# Patient Record
Sex: FEMALE
[De-identification: names, ages, dates, MRNs, and addresses within clinical notes are randomized per-mention and may not be internally consistent; named-entity substitution may affect disease eponyms.]

---

## 2019-07-18 ENCOUNTER — TRANSCRIPTION ENCOUNTER (OUTPATIENT)
Age: 19
End: 2019-07-18

## 2019-08-19 ENCOUNTER — TRANSCRIPTION ENCOUNTER (OUTPATIENT)
Age: 19
End: 2019-08-19

## 2019-11-27 ENCOUNTER — TRANSCRIPTION ENCOUNTER (OUTPATIENT)
Age: 19
End: 2019-11-27

## 2020-09-28 ENCOUNTER — TRANSCRIPTION ENCOUNTER (OUTPATIENT)
Age: 20
End: 2020-09-28

## 2020-12-06 ENCOUNTER — TRANSCRIPTION ENCOUNTER (OUTPATIENT)
Age: 20
End: 2020-12-06

## 2021-03-12 ENCOUNTER — TRANSCRIPTION ENCOUNTER (OUTPATIENT)
Age: 21
End: 2021-03-12

## 2021-10-12 ENCOUNTER — TRANSCRIPTION ENCOUNTER (OUTPATIENT)
Age: 21
End: 2021-10-12

## 2021-10-31 ENCOUNTER — TRANSCRIPTION ENCOUNTER (OUTPATIENT)
Age: 21
End: 2021-10-31

## 2022-01-22 ENCOUNTER — TRANSCRIPTION ENCOUNTER (OUTPATIENT)
Age: 22
End: 2022-01-22

## 2022-01-25 ENCOUNTER — TRANSCRIPTION ENCOUNTER (OUTPATIENT)
Age: 22
End: 2022-01-25

## 2022-07-06 ENCOUNTER — NON-APPOINTMENT (OUTPATIENT)
Age: 22
End: 2022-07-06

## 2022-07-20 ENCOUNTER — RESULT REVIEW (OUTPATIENT)
Age: 22
End: 2022-07-20

## 2023-02-16 ENCOUNTER — APPOINTMENT (OUTPATIENT)
Dept: ORTHOPEDIC SURGERY | Facility: CLINIC | Age: 23
End: 2023-02-16
Payer: COMMERCIAL

## 2023-02-16 DIAGNOSIS — Z78.9 OTHER SPECIFIED HEALTH STATUS: ICD-10-CM

## 2023-02-16 DIAGNOSIS — Z72.0 TOBACCO USE: ICD-10-CM

## 2023-02-16 DIAGNOSIS — M75.21 BICIPITAL TENDINITIS, RIGHT SHOULDER: ICD-10-CM

## 2023-02-16 PROCEDURE — 99203 OFFICE O/P NEW LOW 30 MIN: CPT

## 2023-02-16 PROCEDURE — 73030 X-RAY EXAM OF SHOULDER: CPT | Mod: RT

## 2023-02-16 NOTE — PHYSICAL EXAM
[de-identified] : Examination of the right shoulder is as follows: \par INSPECTION: no swelling, no ecchymosis, no erythema, no atrophy, no deformity, no scapular winging.\par PALPATION: bicipital groove tenderness\par ROM: active forward flexion 180 degrees, active abduction 180 degrees, internal rotation T12, external rotation 80 degrees. \par -Motion is assessed: standing\par -Pain at end of ROM: mild\par STRENGTH: forward flexion 5/5, abduction 5/5, external rotation 5/5, internal rotation 5/5\par TESTS: negative impingement testing, negative Michele. \par NEURO: motor and sensory intact distally. \par \par X-rays of the right shoulder is as follows:\par Shoulder 2+ View: There are no fractures, subluxations or dislocations. No significant abnormalities are seen.

## 2023-02-16 NOTE — ASSESSMENT
[FreeTextEntry1] : 21 yo female presenting with right bicipital tendinitis. X-rays negative. Recommend non-surgical management\par -advised to avoid overhead lifting activities/aggravating factors\par -Discussed with patient that if pain does not improve to RTO as she may have SLAP tear, patient expressed understanding\par -Activities as tolerated\par -Rest, ice, compression, elevation, NSAIDs PRN for pain. \par -All questions answered\par -F/u PRN\par

## 2023-02-16 NOTE — HISTORY OF PRESENT ILLNESS
[Sudden] : sudden [6] : 6 [0] : 0 [Shooting] : shooting [Intermittent] : intermittent [Household chores] : household chores [Leisure] : leisure [Social interactions] : social interactions [Rest] : rest [Full time] : Work status: full time [de-identified] : Patient is here for her right shoulder. Patient states she was using a machine at the gym and started to feel pain the day after on 2/14/2023. Patient states she has shooting pain when reaching behind her back and reaching upward.  [] : Post Surgical Visit: no [FreeTextEntry1] : Right shoulder  [FreeTextEntry3] : 2/14/2023 [FreeTextEntry5] : Patient states she was using a machine at the gym and started to feel pain  [de-identified] : Movement  [de-identified] :